# Patient Record
Sex: FEMALE | Race: OTHER | HISPANIC OR LATINO | URBAN - METROPOLITAN AREA
[De-identification: names, ages, dates, MRNs, and addresses within clinical notes are randomized per-mention and may not be internally consistent; named-entity substitution may affect disease eponyms.]

---

## 2017-04-02 ENCOUNTER — EMERGENCY (EMERGENCY)
Facility: HOSPITAL | Age: 42
LOS: 1 days | Discharge: HOME | End: 2017-04-02
Attending: STUDENT IN AN ORGANIZED HEALTH CARE EDUCATION/TRAINING PROGRAM | Admitting: STUDENT IN AN ORGANIZED HEALTH CARE EDUCATION/TRAINING PROGRAM
Payer: MEDICAID

## 2017-04-02 VITALS
TEMPERATURE: 99 F | WEIGHT: 173.94 LBS | SYSTOLIC BLOOD PRESSURE: 122 MMHG | RESPIRATION RATE: 18 BRPM | DIASTOLIC BLOOD PRESSURE: 76 MMHG | HEIGHT: 66 IN | OXYGEN SATURATION: 100 % | HEART RATE: 63 BPM

## 2017-04-02 DIAGNOSIS — Z98.89 OTHER SPECIFIED POSTPROCEDURAL STATES: Chronic | ICD-10-CM

## 2017-04-02 DIAGNOSIS — G43.909 MIGRAINE, UNSPECIFIED, NOT INTRACTABLE, WITHOUT STATUS MIGRAINOSUS: ICD-10-CM

## 2017-04-02 DIAGNOSIS — I10 ESSENTIAL (PRIMARY) HYPERTENSION: ICD-10-CM

## 2017-04-02 DIAGNOSIS — R51 HEADACHE: ICD-10-CM

## 2017-04-02 PROCEDURE — 99284 EMERGENCY DEPT VISIT MOD MDM: CPT | Mod: 25

## 2017-04-02 PROCEDURE — 93010 ELECTROCARDIOGRAM REPORT: CPT

## 2017-04-02 NOTE — ED ADULT TRIAGE NOTE - CHIEF COMPLAINT QUOTE
pt biba c/o CP, high blood pressure, headache, n/v and difficulty breathing.  CP and dyspnea began today.  pt appears very anxious and is dry heaving in triage.

## 2017-04-03 PROCEDURE — 93005 ELECTROCARDIOGRAM TRACING: CPT

## 2017-04-03 PROCEDURE — 96375 TX/PRO/DX INJ NEW DRUG ADDON: CPT

## 2017-04-03 PROCEDURE — 96374 THER/PROPH/DIAG INJ IV PUSH: CPT

## 2017-04-03 PROCEDURE — 99284 EMERGENCY DEPT VISIT MOD MDM: CPT | Mod: 25

## 2017-04-03 RX ORDER — KETOROLAC TROMETHAMINE 30 MG/ML
30 SYRINGE (ML) INJECTION ONCE
Qty: 0 | Refills: 0 | Status: DISCONTINUED | OUTPATIENT
Start: 2017-04-03 | End: 2017-04-03

## 2017-04-03 RX ORDER — SODIUM CHLORIDE 9 MG/ML
3 INJECTION INTRAMUSCULAR; INTRAVENOUS; SUBCUTANEOUS EVERY 8 HOURS
Qty: 0 | Refills: 0 | Status: DISCONTINUED | OUTPATIENT
Start: 2017-04-03 | End: 2017-04-06

## 2017-04-03 RX ORDER — SODIUM CHLORIDE 9 MG/ML
1000 INJECTION INTRAMUSCULAR; INTRAVENOUS; SUBCUTANEOUS ONCE
Qty: 0 | Refills: 0 | Status: COMPLETED | OUTPATIENT
Start: 2017-04-03 | End: 2017-04-03

## 2017-04-03 RX ORDER — IBUPROFEN 200 MG
1 TABLET ORAL
Qty: 20 | Refills: 0 | OUTPATIENT
Start: 2017-04-03 | End: 2017-04-08

## 2017-04-03 RX ORDER — METOCLOPRAMIDE HCL 10 MG
10 TABLET ORAL ONCE
Qty: 0 | Refills: 0 | Status: COMPLETED | OUTPATIENT
Start: 2017-04-03 | End: 2017-04-03

## 2017-04-03 RX ADMIN — SODIUM CHLORIDE 1000 MILLILITER(S): 9 INJECTION INTRAMUSCULAR; INTRAVENOUS; SUBCUTANEOUS at 01:23

## 2017-04-03 RX ADMIN — Medication 10 MILLIGRAM(S): at 01:23

## 2017-04-03 RX ADMIN — Medication 30 MILLIGRAM(S): at 01:20

## 2017-04-03 RX ADMIN — Medication 30 MILLIGRAM(S): at 02:56

## 2017-04-03 RX ADMIN — SODIUM CHLORIDE 3 MILLILITER(S): 9 INJECTION INTRAMUSCULAR; INTRAVENOUS; SUBCUTANEOUS at 01:27

## 2017-04-03 NOTE — ED PROVIDER NOTE - NS ED MD SCRIBE ATTENDING SCRIBE SECTIONS
PAST MEDICAL/SURGICAL/SOCIAL HISTORY/DISPOSITION/VITAL SIGNS( Pullset)/HISTORY OF PRESENT ILLNESS/HIV/REVIEW OF SYSTEMS/PHYSICAL EXAM

## 2017-04-03 NOTE — ED PROVIDER NOTE - PMH
Headache    HTN (hypertension)  pt states history of htn with pregnancy .currently taking no meds  Multiparity

## 2017-04-03 NOTE — ED PROVIDER NOTE - CRANIAL NERVE AND PUPILLARY EXAM
peripheral vision intact/cough reflex intact/corneal reflex intact/cranial nerves 2-12 intact/gag reflex intact/central and peripheral vision intact/extra-ocular movements intact/central vision intact/tongue is midline

## 2017-04-03 NOTE — ED PROVIDER NOTE - PROGRESS NOTE DETAILS
Patient feeling better with resolution of headache and currently at baseline. Patient was unsure of medicaiton but Was able to speak with the senior whom confirmed that sevela months ago patient was on propanolol 20 mg bid and sumitriptan. Patient feeling better with resolution of headache and currently at baseline. Patient was unsure of medication but Was able to speak with the senior whom confirmed that several months ago patient was on propanolol 20 mg bid and sumitriptan.

## 2017-04-03 NOTE — ED ADULT NURSE NOTE - OBJECTIVE STATEMENT
Pt received sitting on stretcher in NAD. Pt AOx3 C/o migrane since yesterday. Pt denies taking any medication and states this is common for her Neuro WNL. PERRLA. Lungs CTA, RR even unlabored. Ab soft non tender, + bowel sounds x 4quads. Denies Nausea, Vomiting, Diarrhea. Skin warm, dry, color appropriate for age and race.

## 2017-04-03 NOTE — ED ADULT NURSE NOTE - NS ED NURSE DC INFO COMPLEXITY
Verbalized Understanding/Simple: Patient demonstrates quick and easy understanding/Patient asked questions/Returned Demonstration/Complex: Multiple Rx/Tx. Pt has difficulty understanding. Requires additional help

## 2017-04-03 NOTE — ED PROVIDER NOTE - OBJECTIVE STATEMENT
40 y/o F pt with PMHx of HTN and migraines presents to ED c/o right sided neck pain, right sided headache,  nausea, vomiting, and chills. Pt reports that pain worsens with movement. The pain radiates to her right shoulder and head. She had 2 episodes of vomiting earlier today. She also has acid reflux with burping. When she has the pain, her "hands and feet fall asleep". Her BP is also elevated. She has applied ice to her neck with mild relief. She is not currently taking medication for HTN because she ran out 8 days ago. She self medicated with Advil 3 tablets. Pt denies fever, rashes, abdominal pain, back pain, joint pain, calf pain, tobacco use, pregnancy, and diarrhea. No further complaints at this time. NKDA. 42 y/o F pt with PMHx of HTN and migraines presents to ED c/o right sided neck pain, right sided headache,  nausea, vomiting, and chills. Pt reports that pain worsens with movement. The pain radiates to her right shoulder/upper chest from her head. She had 2 episodes of vomiting earlier today. She also has acid reflux with burping. When she has the pain, her "hands and feet fall asleep". She also felt that her BP is also elevated. She has applied ice to her neck with mild relief. She is not currently taking medication for HTN because she ran out 8 days ago. She self medicated with Advil 3 tablets. Pt denies fever, rashes, abdominal pain, back pain, joint pain, calf pain, tobacco use, pregnancy, and diarrhea. No further complaints at this time. NKDA.

## 2020-12-28 NOTE — ED PROVIDER NOTE - SEVERITY
MODERATE Helical Rim Advancement Flap Text: The defect edges were debeveled with a #15 blade scalpel.  Given the location of the defect and the proximity to free margins (helical rim) a double helical rim advancement flap was deemed most appropriate.  Using a sterile surgical marker, the appropriate advancement flaps were drawn incorporating the defect and placing the expected incisions between the helical rim and antihelix where possible.  The area thus outlined was incised through and through with a #15 scalpel blade.  With a skin hook and iris scissors, the flaps were gently and sharply undermined and freed up.

## 2021-06-22 NOTE — ED ADULT NURSE NOTE - NS PRO PASSIVE SMOKE EXP
Platelet: 10 HGB 6.7/ HCT 20.3 HGB 6.8 HCT 20.9 Platelet 10 Potassium is 2.9 and lactate is 4.4 from ABG Platelets -11 Pt with PLT level 15 Pt's APTT was 178.2 Unknown

## 2023-05-29 NOTE — ED ADULT NURSE NOTE - CHPI ED SYMPTOMS NEG
No no blurred vision/no dizziness/no numbness/no confusion/no vomiting/no fever/no change in level of consciousness/no weakness/no loss of consciousness/no nausea

## 2023-09-01 NOTE — ED ADULT TRIAGE NOTE - CCCP TRG CHIEF CMPLNT
Take over the counter motrin or tylenol for your symptoms. Return to ER prn as needed.
multiple medical complaints

## 2023-10-24 NOTE — ED ADULT NURSE NOTE - IN THE PAST YEAR, HOW OFTEN HAVE YOU USED TOBACCO PRODUCTS?
Detail Level: Detailed Depth Of Biopsy: dermis Was A Bandage Applied: Yes Size Of Lesion In Cm: 0.7 X Size Of Lesion In Cm: 0 Anticipated Plan (Based On Presumed Biopsy Results): ed&c Biopsy Type: H and E Biopsy Method: double edge Personna blade Anesthesia Type: 1% lidocaine with epinephrine Anesthesia Volume In Cc (Will Not Render If 0): 0.5 Hemostasis: Nina's Wound Care: Petrolatum Dressing: bandage Destruction After The Procedure: No Type Of Destruction Used: Curettage Curettage Text: The wound bed was treated with curettage after the biopsy was performed. Cryotherapy Text: The wound bed was treated with cryotherapy after the biopsy was performed. Electrodesiccation Text: The wound bed was treated with electrodesiccation after the biopsy was performed. Electrodesiccation And Curettage Text: The wound bed was treated with electrodesiccation and curettage after the biopsy was performed. Silver Nitrate Text: The wound bed was treated with silver nitrate after the biopsy was performed. Lab: -[id number] Consent: Written consent was obtained and risks were reviewed including but not limited to scarring, infection, bleeding, scabbing, incomplete removal, nerve damage and allergy to anesthesia. Post-Care Instructions: I reviewed with the patient in detail post-care instructions. Patient is to keep the biopsy site dry overnight, and then apply bacitracin twice daily until healed. Patient may apply hydrogen peroxide soaks to remove any crusting. Notification Instructions: Patient will be notified of biopsy results. However, patient instructed to call the office if not contacted within 2 weeks. Billing Type: [employer] Information: Selecting Yes will display possible errors in your note based on the variables you have selected. This validation is only offered as a suggestion for you. PLEASE NOTE THAT THE VALIDATION TEXT WILL BE REMOVED WHEN YOU FINALIZE YOUR NOTE. IF YOU WANT TO FAX A PRELIMINARY NOTE YOU WILL NEED TO TOGGLE THIS TO 'NO' IF YOU DO NOT WANT IT IN YOUR FAXED NOTE. Never

## 2023-10-31 ENCOUNTER — EMERGENCY (EMERGENCY)
Facility: HOSPITAL | Age: 48
LOS: 1 days | Discharge: HOME | End: 2023-10-31
Attending: STUDENT IN AN ORGANIZED HEALTH CARE EDUCATION/TRAINING PROGRAM
Payer: COMMERCIAL

## 2023-10-31 VITALS
SYSTOLIC BLOOD PRESSURE: 112 MMHG | TEMPERATURE: 98 F | HEART RATE: 75 BPM | RESPIRATION RATE: 18 BRPM | DIASTOLIC BLOOD PRESSURE: 62 MMHG

## 2023-10-31 VITALS
RESPIRATION RATE: 18 BRPM | DIASTOLIC BLOOD PRESSURE: 82 MMHG | TEMPERATURE: 98 F | OXYGEN SATURATION: 100 % | SYSTOLIC BLOOD PRESSURE: 122 MMHG | WEIGHT: 195.99 LBS | HEART RATE: 75 BPM

## 2023-10-31 DIAGNOSIS — Z98.89 OTHER SPECIFIED POSTPROCEDURAL STATES: Chronic | ICD-10-CM

## 2023-10-31 LAB
HCG SERPL-ACNC: <4 MIU/ML — SIGNIFICANT CHANGE UP
HCG SERPL-ACNC: <4 MIU/ML — SIGNIFICANT CHANGE UP

## 2023-10-31 PROCEDURE — T1013: CPT

## 2023-10-31 PROCEDURE — 36415 COLL VENOUS BLD VENIPUNCTURE: CPT

## 2023-10-31 PROCEDURE — 84702 CHORIONIC GONADOTROPIN TEST: CPT

## 2023-10-31 PROCEDURE — 99284 EMERGENCY DEPT VISIT MOD MDM: CPT | Mod: 25

## 2023-10-31 PROCEDURE — 96374 THER/PROPH/DIAG INJ IV PUSH: CPT

## 2023-10-31 PROCEDURE — 96375 TX/PRO/DX INJ NEW DRUG ADDON: CPT

## 2023-10-31 RX ORDER — FAMOTIDINE 10 MG/ML
20 INJECTION INTRAVENOUS ONCE
Refills: 0 | Status: COMPLETED | OUTPATIENT
Start: 2023-10-31 | End: 2023-10-31

## 2023-10-31 RX ORDER — FAMOTIDINE 10 MG/ML
1 INJECTION INTRAVENOUS
Qty: 47 | Refills: 0
Start: 2023-10-31 | End: 2023-12-16

## 2023-10-31 RX ORDER — DIPHENHYDRAMINE HCL 50 MG
1 CAPSULE ORAL
Qty: 12 | Refills: 0
Start: 2023-10-31 | End: 2023-11-03

## 2023-10-31 RX ORDER — DIPHENHYDRAMINE HCL 50 MG
25 CAPSULE ORAL ONCE
Refills: 0 | Status: COMPLETED | OUTPATIENT
Start: 2023-10-31 | End: 2023-10-31

## 2023-10-31 RX ORDER — SODIUM CHLORIDE 9 MG/ML
1000 INJECTION INTRAMUSCULAR; INTRAVENOUS; SUBCUTANEOUS ONCE
Refills: 0 | Status: COMPLETED | OUTPATIENT
Start: 2023-10-31 | End: 2023-10-31

## 2023-10-31 RX ORDER — EPINEPHRINE 0.3 MG/.3ML
0.3 INJECTION INTRAMUSCULAR; SUBCUTANEOUS
Qty: 1 | Refills: 0
Start: 2023-10-31

## 2023-10-31 RX ADMIN — Medication 25 MILLIGRAM(S): at 05:57

## 2023-10-31 RX ADMIN — FAMOTIDINE 20 MILLIGRAM(S): 10 INJECTION INTRAVENOUS at 05:57

## 2023-10-31 RX ADMIN — SODIUM CHLORIDE 1000 MILLILITER(S): 9 INJECTION INTRAMUSCULAR; INTRAVENOUS; SUBCUTANEOUS at 05:57

## 2023-10-31 RX ADMIN — Medication 125 MILLIGRAM(S): at 05:57

## 2023-10-31 NOTE — ED PROVIDER NOTE - PHYSICAL EXAMINATION
General: In NAD, non-toxic/well-appearing.  Skin: Warm, dry, color normal for race. Perfused. Facial erythema.   Head: Normocephalic/atraumatic.   Eyes: Sclera anicteric, conjunctivae clear b/l. PERRLA, EOMI.   Throat: Airway patent. Tolerating secretions, no drooling. Moist mucus membranes. Tongue midline, no swelling.  Neck: Supple, FROM.   Cardio: Rate and rhythm regular. No murmurs.   PV: Capillary refill <2 seconds.  Resp: Speaking in full sentences. No visible nasal flaring, retractions, or deformity. No stridor. Breath sounds vesicular, symmetrical and without wheezing b/l.  MSK: MAEx4. FROM.   Neuro: A&Ox3. Appears nonfocal.

## 2023-10-31 NOTE — ED PROVIDER NOTE - OBJECTIVE STATEMENT
46 yo female no PMHx presents to ED c/o allergic reaction. Patient knowingly ate soup containing fish Sunday afternoon. Sunday night began with rash to abdomen, worsening Monday spreading to face. Allergic reactions normally last 2-3 days. Medicated with Benadryl (2 pills, unknown dosage) 2 hours ago. No further complaints at this time.   Denies anaphylaxis reaction, difficulty breathing, vomiting.

## 2023-10-31 NOTE — ED ADULT TRIAGE NOTE - CHIEF COMPLAINT QUOTE
c/o of itching and rash that developed on body after eating a fish soup yesterday. Took benadryl at 330 am.

## 2023-10-31 NOTE — ED PROVIDER NOTE - NSICDXPASTMEDICALHX_GEN_ALL_CORE_FT
PAST MEDICAL HISTORY:  Headache     HTN (hypertension) pt states history of htn with pregnancy .currently taking no meds    Multiparity

## 2023-10-31 NOTE — ED PROVIDER NOTE - CLINICAL SUMMARY MEDICAL DECISION MAKING FREE TEXT BOX
48 yo female no PMHx presents to ED c/o allergic reaction after knowingly eating fish which patient is allergic to. Patient with facial erythema. Normal VS. No signs of systemic reaction. 48 yo female no PMHx presents to ED c/o allergic reaction after knowingly eating fish which patient is allergic to. Patient with facial erythema. Normal VS. No signs of systemic reaction. Improved after medications. Stable VS. Extensive time spent counseling patient with  at discharge.

## 2023-10-31 NOTE — ED PROVIDER NOTE - CARE PROVIDER_API CALL
Jamila Us  Allergy and Immunology  [address] [place] [address]  Phone: [phone]  Fax: [phone]  Follow Up Time:

## 2023-10-31 NOTE — ED ADULT NURSE NOTE - NSFALLUNIVINTERV_ED_ALL_ED
Bed/Stretcher in lowest position, wheels locked, appropriate side rails in place/Call bell, personal items and telephone in reach/Instruct patient to call for assistance before getting out of bed/chair/stretcher/Non-slip footwear applied when patient is off stretcher/[place] to call system/Physically safe environment - no spills, clutter or unnecessary equipment/Purposeful proactive rounding/Room/bathroom lighting operational, light cord in reach

## 2023-10-31 NOTE — ED PROVIDER NOTE - NSFOLLOWUPINSTRUCTIONS_ED_ALL_ED_FT
- Prescription sent to pharmacy.  - Please bring all documentation from your ED visit to any related future follow up appointment.  - Please call to schedule follow up appointment with your primary care physician within 24-48 hours.  - Please seek immediate medical attention for any new/worsening, signs/symptoms, or concerns.    Feel better!    - Receta enviada a farmacia.  - Traiga toda la documentación de fox visita al servicio de urgencias a cualquier neel de seguimiento futura relacionada.  - Llame para programar misha neel de seguimiento con fox médico de atención primaria dentro de las 24 a 48 horas.  - Busque atención médica inmediata ante cualquier nuevo / empeoramiento, signos / síntomas o inquietudes.    ¡Sentirse mejor!       Reacción alérgica general      CUIDADO AMBULATORIO:    Misha reacción alérgica es la respuesta de fox cuerpo a un alérgeno. Los alérgenos incluyen medicamentos, alimentos, picaduras de insectos, caspa de animales, moho, látex, productos químicos y ácaros del polvo. El polen de los árboles, el césped y las malas hierbas también puede provocar misha reacción alérgica. Misha reacción alérgica puede variar de leve a grave.    Signos y síntomas comunes:  • Estornudos y secreción, picazón o congestión nasal      • Ojos hinchados, llorosos o con picazón      • Inflamación o picazón leve o severa de la piel      • Hinchazón o dolor donde un insecto le picó o le picó      • Dificultad para respirar o tragar, tos o sibilancias      • Sarpullido o urticaria      • Se siente aturdido o mareado      Llame al 911 para detectar signos o síntomas de anafilaxia, ping dificultad para respirar, hinchazón en la boca o garganta o sibilancias. También puede tener picazón, sarpullido, urticaria o sentir que se va a desmayar.    Busque atención médica de inmediato si:  • Tiene sarpullido, urticaria, hinchazón o picazón que está empezando a empeorar.      • Se le aprieta la garganta o se le hinchan los labios o la lengua.      • Tiene problemas para tragar o hablar.      • Tiene náuseas, diarrea o calambres abdominales que empeoran, o está vomitando.      • Tiene dolor u opresión en el pecho.      Comuníquese con fox proveedor de atención médica si:  • Tiene preguntas o inquietudes sobre fox afección o atención.    El tratamiento para misha reacción alérgica general puede incluir medicamentos para aliviar ciertos síntomas de la alergia, ping picazón, estornudos e hinchazón. Puede tomarlos en forma de pastilla o usar gotas en la nariz o los ojos. Se pueden administrar tratamientos tópicos para aplicar directamente sobre la piel para ayudar a disminuir la picazón o la hinchazón. Se puede recetar epinefrina si tiene riesgo de anafilaxia. Ésta es misha reacción alérgica grave que puede poner en peligro la stone. Fox proveedor de atención médica le dirá si necesita llevar epinefrina consigo. Se le enseñará cuándo y cómo usarlo.    Maneje edward síntomas:  • Evite los alérgenos. Es posible que deba realizarse misha prueba de alergia con ofx proveedor de atención médica o un especialista para encontrar edward alérgenos.      • Use compresas frías en la piel o los ojos. [place] ayudará a calmar la piel o los ojos afectados por la reacción alérgica. Puede hacer misha compresa fría empapando un paño en agua fría. Exprima el exceso de agua antes de aplicar la toallita.      • Enjuague edward fosas nasales con misha solución salina. El enjuague diario puede ayudar a eliminar los alérgenos de la nariz. Utilice agua destilada si es posible. También puede hervir agua del grifo y luego dejarla enfriar antes de usarla. No use agua del grifo sin hervirla benita.      •No fume. La nicotina y otras sustancias químicas en los cigarrillos y los puros pueden empeorar misha reacción alérgica y también pueden causar daño pulmonar. Pídale información a fox proveedor de atención médica si actualmente fuma y necesita ayuda para dejar de fumar. Los cigarrillos electrónicos o el tabaco sin humo todavía contienen nicotina. Hable con fox proveedor de atención médica antes de usar estos productos.      Kare[place] un seguimiento con fox proveedor de atención médica según las indicaciones: Escriba edward preguntas para que recuerde hacerlas franko edward visitas.

## 2023-10-31 NOTE — ED PROVIDER NOTE - PATIENT PORTAL LINK FT
You can access the FollowMyHealth Patient Portal offered by [facility] by registering at the following website: [email or web address]. By joining [employer]’s FollowMyHealth portal, you will also be able to view your health information using other applications (apps) compatible with our system.

## 2023-12-13 NOTE — ED PROVIDER NOTE - NS ED ATTENDING STATEMENT MOD
Presents to the emergency department via EMS from home. EMS was called for a welfare check and found patient altered with strong malodorous urine type smell.    This was a shared visit with the BRITTA. I reviewed and verified the documentation and independently performed the documented:

## 2024-07-04 NOTE — ED ADULT TRIAGE NOTE - SOURCE OF INFORMATION
EMERGENCY DEPARTMENT ENCOUNTER      CHIEF COMPLAINT    Chief Complaint   Patient presents with    Pain       HISTORY OF PRESENT ILLNESS    Kirsty Rutherford is a 53 year old female who presents to the ED for evaluation and treatment of atraumatic bilateral knee pain.  She is unsure when this started.  Denies injury or trauma or fall.  No fevers or chills or recent illnesses.  No erythema or edema or increased warmth.  No numbness, tingling, or focal weakness.  She also needs refill of nystatin for her candidiasis intertrigo.  Patient states that all she needs is for me to put my hand on her so that her pain can go away.    I have reviewed Kirsty Rutherford's previous recent ER notes.  Note Review Summary: Patient well-known to emergency department.  She has chronic pain and schizophrenia.      ALLERGIES    ALLERGIES:  No Known Allergies    CURRENT MEDICATIONS    No current facility-administered medications for this encounter.     Current Outpatient Medications   Medication Sig Dispense Refill    nystatin (MYCOSTATIN) 419187 UNIT/GM powder Apply topically 3 times daily. 30 g 0    docusate sodium (Colace) 100 MG capsule Take 1 capsule by mouth 2 times daily as needed (hard stools). 20 capsule 0    fluticasone (FLONASE) 50 MCG/ACT nasal spray Spray 1 spray in each nostril daily.      tiZANidine (ZANAFLEX) 4 MG tablet Take 4 mg by mouth nightly.      polyethylene glycol (MIRALAX) 17 g packet Take 17 g by mouth 2 days a week. Stir and dissolve powder in any 4 to 8 ounces of beverage, then drink. Takes on Mondays and Fridays      Soap & Cleansers (CeraVe SA Body Wash) Liquid Apply topically daily as needed.      liver oil-zinc oxide (Boudreauxs Butt Paste) 40 % ointment Apply 1 Application topically as needed for Dry Skin.      ibuprofen (MOTRIN) 600 MG tablet Take 600 mg by mouth in the morning and 600 mg in the evening.      ammonium lactate (LAC-HYDRIN) 12 % cream Apply 1 Application topically in the morning and 1  Application in the evening.      DULoxetine HCl 60 MG Capsule Delayed Release Sprinkle Take 60 mg by mouth daily.      famotidine (PEPCID) 20 MG tablet Take 20 mg by mouth daily.      ferrous sulfate 325 (65 FE) MG tablet Take 325 mg by mouth every other day.      LORazepam (ATIVAN) 1 MG tablet Take 1 mg by mouth nightly.      OLANZapine (ZyPREXA) 5 MG tablet Take 5 mg by mouth every evening.      risperiDONE (RisperDAL) 1 MG tablet Take 1 mg by mouth in the morning and 1 mg in the evening.      acetaminophen (TYLENOL) 325 MG tablet Take 975 mg by mouth in the morning and 975 mg at noon and 975 mg in the evening.      amLODIPine (NORVASC) 10 MG tablet Take 10 mg by mouth daily.         PAST MEDICAL HISTORY    Past Medical History:   Diagnosis Date    Anxiety     Arthritis     Bilateral Knees    Chronic obstructive pulmonary disease  (CMD) 1/30/2023    Depression     Dyslipidemia     Essential (primary) hypertension     Gastroesophageal reflux disease without esophagitis 08/08/2023    HLD (hyperlipidemia) 05/14/2014    Hypothyroid 04/05/2012    Morbid obesity  (CMD)     Morbid obesity  (CMD)     Schizophrenia  (CMD)     Type II Diabetes 04/05/2012    Unspecified asthma, uncomplicated (CMD) 1/30/2023       SURGICAL HISTORY    Past Surgical History:   Procedure Laterality Date    Hernia repair  01/15/2019       SOCIAL HISTORY    Social History     Tobacco Use    Smoking status: Never    Smokeless tobacco: Never   Vaping Use    Vaping status: never used   Substance Use Topics    Alcohol use: Yes     Comment: occasional    Drug use: No       FAMILY HISTORY    Family History   Problem Relation Age of Onset    Diabetes Mother     Stroke Father          REVIEW OF SYSTEMS    Constitutional:  See history of present illness  Eyes:  Denies eye pain  HENT:  Denies sore throat and ear pain   Respiratory:  Denies cough and shortness of breath   Cardiovascular:  Denies chest pain  GI:  Denies abdominal pain  :  Denies dysuria    Musculoskeletal:  Denies back pain and neck pain   Integument:  Denies rash  Neurologic:  Denies headache      PHYSICAL EXAM    Vitals:    07/04/24 0634 07/04/24 0653 07/04/24 0747   BP: (!) 153/75     BP Location: LFA - Left forearm     Patient Position: Sitting/High-Teran's     Pulse: 98     Resp: 18  18   Temp: 98.5 °F (36.9 °C)     TempSrc: Oral     SpO2: 98%  99%   Weight:  (!) 195 kg (429 lb 14.4 oz)    LMP: 03/05/2011       Pulse Ox Interpretation: 98% on room air  Constitutional:  Patient resting on exam table.  She appears older than stated age and is chronically ill-appearing. No acute distress, non-toxic appearance.   Eyes:  Sclera anicteric. PERRL. EOMI.   HENT:  Head is atraumatic. Mucus membranes are moist.   Neck:  Neck is supple. Full passive range of motion.  Respiratory:  No respiratory distress. Lungs are clear to auscultation bilaterally. No rales or wheezing appreciated. No stridor.   Cardiovascular:  Regular rate, regular rhythm. DP pulses 2+ bilaterally.  GI:  Intact bowel sounds. Abdomen is soft, nontender, and nondistended. No rebound or guarding.    Musculoskeletal:  No obvious deformities. No acute lesions to bilateral knees. There is no erythema or increased warmth.  No edema.  Knees with full range of motion. Compartments are soft.  No crepitus.  Patient has no obvious laxity or focal tenderness. Gross sensation to light touch is equal and symmetric in bilateral lower extremities.  Dorsiflexion and plantarflexion symmetric. Neurovascularly intact distally.  She is initially hyperactive and anxious prior to examination, however, immediately after I applied hand to her knees, she states that the pain is completely gone/relieved at this time.  She does not want me to leave the room as she states my touch is causing her to have relief of discomfort.  Integument: [place] pink skin lesions under bilateral breasts with satellite lesions.  Negative Nikolsky and no tenderness.  No vesicles.  No  diaphoresis. Capillary refill less than 3 seconds.   Neurologic:  GCS 15. Alert & oriented to conversation. Moves all extremities. Speech is clear.   Psychiatric: Intermittently hyperactive and anxious.        ED SEPSIS SCORING  Does not meet SIRS nor sepsis criteria      ED TREATMENTS  Medications - No data to display      ED COURSE & MEDICAL DECISION MAKING    53 year old female who presents with atraumatic bilateral knee pain and request for refill of nystatin.    Patient is afebrile and nontoxic appearing.  Hypertensive on presentation and follow-up instructed.  All other vital signs physiologic.  This pain seems chronic in nature.  She has had multiple similar visits in the past.  She has no focal bony tenderness on exam and I do not see need for x-ray imaging.  Baseline edema of lower extremities is noted and patient has no unilateral lower extremity edema and I doubt DVT.  No complaints of chest pain or shortness of breath and I doubt PE.  History and exam not concerning for ischemia or compartment syndrome or septic arthritis.  Nystatin refill is provided.  On reassessment, she is again hyperactive and anxious until my entry to the room where she is now smiling and resting comfortably.  She remains neurovascular intact distally without acute infectious or traumatic findings.  She is appropriate for discharge.  Return precautions discussed. All questions and concerns addressed. Treatment plan to include supportive measures, continuing medications as directed, nystatin powder, and follow-up with family practice in the next few days. Patient discharged in stable condition.    No Critical Care      IMPRESSION  The primary encounter diagnosis was Leg pain, bilateral. Diagnoses of Elevated blood pressure reading and Candidal skin infection were also pertinent to this visit.      Capo Ambrose PA-C    Supervising physician: ETHAN Badillo       I discussed/staffed case with ETHAN Badillo , who agrees  with the assessment and plan outlined above.       Capo Ambrose PA-C  07/04/24 0804    Because of her anxiety, EMS request treatment prior to transfer.  Ativan 1 mg IM is ordered.     Capo Ambrose PA-C  07/04/24 0900     Patient/EMS

## 2024-08-20 ENCOUNTER — APPOINTMENT (OUTPATIENT)
Dept: NEUROLOGY | Facility: CLINIC | Age: 49
End: 2024-08-20
Payer: COMMERCIAL

## 2024-08-20 VITALS
DIASTOLIC BLOOD PRESSURE: 77 MMHG | HEIGHT: 62 IN | BODY MASS INDEX: 34.78 KG/M2 | HEART RATE: 75 BPM | OXYGEN SATURATION: 96 % | WEIGHT: 189 LBS | SYSTOLIC BLOOD PRESSURE: 115 MMHG

## 2024-08-20 DIAGNOSIS — Z86.39 PERSONAL HISTORY OF OTHER ENDOCRINE, NUTRITIONAL AND METABOLIC DISEASE: ICD-10-CM

## 2024-08-20 DIAGNOSIS — Z86.69 PERSONAL HISTORY OF OTHER DISEASES OF THE NERVOUS SYSTEM AND SENSE ORGANS: ICD-10-CM

## 2024-08-20 DIAGNOSIS — Z87.39 PERSONAL HISTORY OF OTHER DISEASES OF THE MUSCULOSKELETAL SYSTEM AND CONNECTIVE TISSUE: ICD-10-CM

## 2024-08-20 DIAGNOSIS — Z82.0 FAMILY HISTORY OF EPILEPSY AND OTHER DISEASES OF THE NERVOUS SYSTEM: ICD-10-CM

## 2024-08-20 PROCEDURE — 99204 OFFICE O/P NEW MOD 45 MIN: CPT

## 2024-08-20 RX ORDER — BUTALBIT/ACETAMIN/CAFF/CODEINE 50-325-30
50-325-40-30 CAPSULE ORAL
Refills: 0 | Status: ACTIVE | COMMUNITY

## 2024-08-20 RX ORDER — TOPIRAMATE 25 MG/1
25 TABLET, FILM COATED ORAL
Refills: 0 | Status: ACTIVE | COMMUNITY

## 2024-08-20 RX ORDER — BUTALBITAL, ACETAMINOPHEN AND CAFFEINE 300; 50; 40 MG/1; MG/1; MG/1
50-300-40 CAPSULE ORAL 3 TIMES DAILY
Qty: 60 | Refills: 0 | Status: ACTIVE | COMMUNITY
Start: 2024-08-20 | End: 1900-01-01

## 2024-08-20 RX ORDER — MELOXICAM 15 MG/1
15 TABLET ORAL
Refills: 0 | Status: ACTIVE | COMMUNITY

## 2024-08-20 RX ORDER — TOPIRAMATE 25 MG/1
25 TABLET, FILM COATED ORAL
Qty: 120 | Refills: 1 | Status: ACTIVE | COMMUNITY
Start: 2024-08-20 | End: 1900-01-01

## 2024-08-20 RX ORDER — PAROXETINE HYDROCHLORIDE 20 MG/1
20 TABLET, FILM COATED ORAL
Refills: 0 | Status: ACTIVE | COMMUNITY

## 2024-08-20 RX ORDER — LEVOTHYROXINE SODIUM 0.17 MG/1
TABLET ORAL
Refills: 0 | Status: ACTIVE | COMMUNITY

## 2024-08-20 NOTE — PHYSICAL EXAM
[FreeTextEntry1] :   General: Cooperative, NAD HEENT: NC/AT, no carotid bruits Lungs: CTAB Chest: RRR, no murmurs Extremities: nontender, no erythema Neurological Examination: MS: AOx3. Appropriately interactive, normal affect. Speech fluent w/o paraphasic errors CN: PERLL, EOMI, V1-3 sensation intact, face symmetric, hearing intact, palate elevates symmetrically, tongue midline, SCM equal bilaterally Motor: normal bulk and tone, no tremor, rigidity or bradykinesia.  5/5 all over Sens: Intact to light touch. Reflexes: 2/4 all over, downgoing toes b/l Coord:  No dysmetria, [place] intact Gait: Normal

## 2024-11-18 ENCOUNTER — APPOINTMENT (OUTPATIENT)
Dept: NEUROLOGY | Facility: CLINIC | Age: 49
End: 2024-11-18
Payer: COMMERCIAL

## 2024-11-18 VITALS
HEIGHT: 62 IN | WEIGHT: 189 LBS | HEART RATE: 76 BPM | DIASTOLIC BLOOD PRESSURE: 69 MMHG | OXYGEN SATURATION: 96 % | SYSTOLIC BLOOD PRESSURE: 101 MMHG | BODY MASS INDEX: 34.78 KG/M2

## 2024-11-18 DIAGNOSIS — M79.18 MYALGIA, OTHER SITE: ICD-10-CM

## 2024-11-18 PROCEDURE — 99214 OFFICE O/P EST MOD 30 MIN: CPT

## 2024-11-18 RX ORDER — METHOCARBAMOL 500 MG/1
500 TABLET, FILM COATED ORAL 3 TIMES DAILY
Qty: 60 | Refills: 2 | Status: ACTIVE | COMMUNITY
Start: 2024-11-18 | End: 1900-01-01

## 2024-11-18 RX ORDER — TOPIRAMATE 100 MG/1
100 TABLET, FILM COATED ORAL
Qty: 30 | Refills: 2 | Status: ACTIVE | COMMUNITY
Start: 2024-11-18 | End: 1900-01-01

## 2025-02-11 ENCOUNTER — APPOINTMENT (OUTPATIENT)
Dept: NEUROLOGY | Facility: CLINIC | Age: 50
End: 2025-02-11

## 2025-07-08 ENCOUNTER — APPOINTMENT (OUTPATIENT)
Dept: NEUROLOGY | Facility: CLINIC | Age: 50
End: 2025-07-08
Payer: COMMERCIAL

## 2025-07-08 VITALS
WEIGHT: 192 LBS | HEIGHT: 62 IN | OXYGEN SATURATION: 99 % | SYSTOLIC BLOOD PRESSURE: 100 MMHG | BODY MASS INDEX: 35.33 KG/M2 | DIASTOLIC BLOOD PRESSURE: 58 MMHG | HEART RATE: 80 BPM

## 2025-07-08 PROBLEM — G47.00 INSOMNIA: Status: ACTIVE | Noted: 2025-07-08

## 2025-07-08 PROBLEM — G43.909 MIGRAINE: Status: ACTIVE | Noted: 2025-07-08

## 2025-07-08 PROCEDURE — 99214 OFFICE O/P EST MOD 30 MIN: CPT | Mod: 25

## 2025-07-08 RX ORDER — BUTALBITAL, ACETAMINOPHEN AND CAFFEINE 50; 325; 40 MG/1; MG/1; MG/1
50-325-40 TABLET ORAL AS DIRECTED
Qty: 10 | Refills: 0 | Status: ACTIVE | COMMUNITY
Start: 2025-07-08 | End: 1900-01-01

## 2025-09-02 ENCOUNTER — NON-APPOINTMENT (OUTPATIENT)
Age: 50
End: 2025-09-02

## 2025-09-02 ENCOUNTER — APPOINTMENT (OUTPATIENT)
Dept: NEUROLOGY | Facility: CLINIC | Age: 50
End: 2025-09-02
Payer: COMMERCIAL

## 2025-09-02 VITALS
DIASTOLIC BLOOD PRESSURE: 68 MMHG | OXYGEN SATURATION: 99 % | HEIGHT: 62 IN | SYSTOLIC BLOOD PRESSURE: 118 MMHG | WEIGHT: 190 LBS | HEART RATE: 80 BPM | BODY MASS INDEX: 34.96 KG/M2

## 2025-09-02 DIAGNOSIS — Z78.9 OTHER SPECIFIED HEALTH STATUS: ICD-10-CM

## 2025-09-02 PROCEDURE — 99214 OFFICE O/P EST MOD 30 MIN: CPT | Mod: 25

## 2025-09-02 RX ORDER — LEVOTHYROXINE SODIUM 0.1 MG/1
100 TABLET ORAL
Refills: 0 | Status: ACTIVE | COMMUNITY

## 2025-09-02 RX ORDER — SUMATRIPTAN 50 MG/1
50 TABLET, FILM COATED ORAL
Qty: 15 | Refills: 5 | Status: ACTIVE | COMMUNITY
Start: 2025-09-02 | End: 1900-01-01